# Patient Record
Sex: MALE | Race: WHITE | NOT HISPANIC OR LATINO | Employment: UNEMPLOYED | URBAN - METROPOLITAN AREA
[De-identification: names, ages, dates, MRNs, and addresses within clinical notes are randomized per-mention and may not be internally consistent; named-entity substitution may affect disease eponyms.]

---

## 2017-02-04 ENCOUNTER — OFFICE VISIT (OUTPATIENT)
Dept: URGENT CARE | Facility: PHYSICIAN GROUP | Age: 62
End: 2017-02-04
Payer: COMMERCIAL

## 2017-02-04 VITALS
RESPIRATION RATE: 16 BRPM | WEIGHT: 210 LBS | HEART RATE: 87 BPM | BODY MASS INDEX: 30.06 KG/M2 | HEIGHT: 70 IN | SYSTOLIC BLOOD PRESSURE: 143 MMHG | OXYGEN SATURATION: 95 % | TEMPERATURE: 97.2 F | DIASTOLIC BLOOD PRESSURE: 80 MMHG

## 2017-02-04 DIAGNOSIS — H61.23 BILATERAL HEARING LOSS DUE TO CERUMEN IMPACTION: ICD-10-CM

## 2017-02-04 PROCEDURE — 99202 OFFICE O/P NEW SF 15 MIN: CPT | Performed by: FAMILY MEDICINE

## 2017-02-04 ASSESSMENT — ENCOUNTER SYMPTOMS
NAUSEA: 0
VOMITING: 0
FEVER: 0
DIZZINESS: 0

## 2017-02-04 ASSESSMENT — PATIENT HEALTH QUESTIONNAIRE - PHQ9: CLINICAL INTERPRETATION OF PHQ2 SCORE: 0

## 2017-02-05 NOTE — PROGRESS NOTES
"Subjective:      Sara Benavides is a 61 y.o. male who presents with Cerumen Impaction            Cerumen Impaction  This is a recurrent problem. The current episode started in the past 7 days. The problem occurs constantly. The problem has been gradually worsening. Pertinent negatives include no fever, nausea or vomiting. Treatments tried: otc wax removal drops. Improvement on treatment: made worse.       Review of Systems   Constitutional: Negative for fever.   Gastrointestinal: Negative for nausea and vomiting.   Neurological: Negative for dizziness.     PMH:  has no past medical history on file.  MEDS:   Current outpatient prescriptions:   •  fenofibrate (TRIGLIDE) 160 MG tablet, TAKE 1 TABLET DAILY, Disp: 90 Tab, Rfl: 1  •  tadalafil (CIALIS) 20 MG tablet, Take 20 mg by mouth as needed for Erectile Dysfunction., Disp: , Rfl:   •  testosterone cypionate (DEPO-TESTOSTERONE) 200 MG/ML Solution injection, 1 mL by Intramuscular route every 14 days., Disp: 2 Vial, Rfl: 2  ALLERGIES: No Known Allergies  SURGHX:   Past Surgical History   Procedure Laterality Date   • Prostatectomy, radical retro       SOCHX:  reports that he has never smoked. He has never used smokeless tobacco. He reports that he does not drink alcohol or use illicit drugs.  FH: Family history was reviewed, no pertinent findings to report       Objective:     /80 mmHg  Pulse 87  Temp(Src) 36.2 °C (97.2 °F)  Resp 16  Ht 1.778 m (5' 10\")  Wt 95.255 kg (210 lb)  BMI 30.13 kg/m2  SpO2 95%     Physical Exam   Constitutional: He appears well-developed.   Pulmonary/Chest: Effort normal.   Neurological: He is alert.   Skin: Skin is warm and dry.   Psychiatric: He has a normal mood and affect.     BILATERAL cerumen impaction          Assessment/Plan:       1. Bilateral hearing loss due to cerumen impaction       Bilateral lavage resolved symptoms  Discussed ear care  Follow-up if symptoms worsen or fail to improve    "

## 2017-04-15 DIAGNOSIS — E78.5 DYSLIPIDEMIA: ICD-10-CM

## 2017-04-17 RX ORDER — FENOFIBRATE 160 MG/1
TABLET ORAL
Qty: 90 TAB | Refills: 1 | Status: SHIPPED | OUTPATIENT
Start: 2017-04-17 | End: 2017-11-26 | Stop reason: SDUPTHER

## 2017-04-17 NOTE — TELEPHONE ENCOUNTER
Refill given, patient due for labs although he does have an appointment in less than 10 days  Thank you  Gemini

## 2017-04-17 NOTE — TELEPHONE ENCOUNTER
Was the patient seen in the last year in this department? Yes     Does patient have an active prescription for medications requested? No     Received Request Via: Pharmacy     Last OV: 5/31/2016  Next OV: not scheduled  Last Labs: 5/28/2016     NEEDS APPT and LABS

## 2017-04-25 ENCOUNTER — TELEPHONE (OUTPATIENT)
Dept: MEDICAL GROUP | Facility: MEDICAL CENTER | Age: 62
End: 2017-04-25

## 2017-04-25 DIAGNOSIS — Z13.1 SCREENING FOR DIABETES MELLITUS: ICD-10-CM

## 2017-04-25 DIAGNOSIS — E78.5 DYSLIPIDEMIA: ICD-10-CM

## 2017-04-28 ENCOUNTER — TELEPHONE (OUTPATIENT)
Dept: MEDICAL GROUP | Facility: MEDICAL CENTER | Age: 62
End: 2017-04-28

## 2017-04-28 NOTE — TELEPHONE ENCOUNTER
----- Message from Nelly Arreola, Med Ass't sent at 4/27/2017 11:11 AM PDT -----  Regarding: FW: Non-Urgent Medical Question  Contact: [phone]      ----- Message -----     From: Sara Benavides     Sent: 4/27/2017  11:09 AM       To: Marily Gómez  Subject: Non-Urgent Medical Question                      Good morning!  Just a follow up in regards to the blood work.  I did get this done yesterday AM.  From [facility].

## 2017-04-28 NOTE — TELEPHONE ENCOUNTER
This pt just got his labs done at [facility] 2 days ago.   Can you please look out for his labs.   Maybe check [facility] in 3-4 days if we have not received them?   Thank you  Gemini

## 2017-05-02 LAB
ALBUMIN SERPL-MCNC: 4.2 G/DL (ref 3.6–4.8)
ALBUMIN/GLOB SERPL: 1.8 {RATIO} (ref 1.2–2.2)
ALP SERPL-CCNC: 43 IU/L (ref 39–117)
ALT SERPL-CCNC: 28 IU/L (ref 0–44)
AST SERPL-CCNC: 28 IU/L (ref 0–40)
BASOPHILS # BLD AUTO: 0 X10E3/UL (ref 0–0.2)
BASOPHILS NFR BLD AUTO: 1 %
BILIRUB SERPL-MCNC: 0.7 MG/DL (ref 0–1.2)
BUN SERPL-MCNC: 16 MG/DL (ref 8–27)
BUN/CREAT SERPL: 11 (ref 10–24)
CALCIUM SERPL-MCNC: 9 MG/DL (ref 8.6–10.2)
CHLORIDE SERPL-SCNC: 102 MMOL/L (ref 96–106)
CHOLEST SERPL-MCNC: 164 MG/DL (ref 100–199)
CO2 SERPL-SCNC: 25 MMOL/L (ref 18–29)
COMMENT 011824: ABNORMAL
CREAT SERPL-MCNC: 1.51 MG/DL (ref 0.76–1.27)
EOSINOPHIL # BLD AUTO: 0.3 X10E3/UL (ref 0–0.4)
EOSINOPHIL NFR BLD AUTO: 3 %
ERYTHROCYTE [DISTWIDTH] IN BLOOD BY AUTOMATED COUNT: 14.8 % (ref 12.3–15.4)
GLOBULIN SER CALC-MCNC: 2.4 G/DL (ref 1.5–4.5)
GLUCOSE SERPL-MCNC: 93 MG/DL (ref 65–99)
HCT VFR BLD AUTO: 52.9 % (ref 37.5–51)
HDLC SERPL-MCNC: 31 MG/DL
HGB BLD-MCNC: 18.2 G/DL (ref 12.6–17.7)
IMM GRANULOCYTES # BLD: 0 X10E3/UL (ref 0–0.1)
IMM GRANULOCYTES NFR BLD: 1 %
IMMATURE CELLS  115398: ABNORMAL
LDLC SERPL CALC-MCNC: 96 MG/DL (ref 0–99)
LYMPHOCYTES # BLD AUTO: 1.6 X10E3/UL (ref 0.7–3.1)
LYMPHOCYTES NFR BLD AUTO: 18 %
MCH RBC QN AUTO: 31.9 PG (ref 26.6–33)
MCHC RBC AUTO-ENTMCNC: 34.4 G/DL (ref 31.5–35.7)
MCV RBC AUTO: 93 FL (ref 79–97)
MONOCYTES # BLD AUTO: 0.7 X10E3/UL (ref 0.1–0.9)
MONOCYTES NFR BLD AUTO: 9 %
MORPHOLOGY BLD-IMP: ABNORMAL
NEUTROPHILS # BLD AUTO: 6 X10E3/UL (ref 1.4–7)
NEUTROPHILS NFR BLD AUTO: 68 %
NRBC BLD AUTO-RTO: ABNORMAL %
PLATELET # BLD AUTO: 221 X10E3/UL (ref 150–379)
POTASSIUM SERPL-SCNC: 4.3 MMOL/L (ref 3.5–5.2)
PROT SERPL-MCNC: 6.6 G/DL (ref 6–8.5)
RBC # BLD AUTO: 5.7 X10E6/UL (ref 4.14–5.8)
SODIUM SERPL-SCNC: 142 MMOL/L (ref 134–144)
TRIGL SERPL-MCNC: 187 MG/DL (ref 0–149)
VLDLC SERPL CALC-MCNC: 37 MG/DL (ref 5–40)
WBC # BLD AUTO: 8.6 X10E3/UL (ref 3.4–10.8)

## 2017-05-16 ENCOUNTER — OFFICE VISIT (OUTPATIENT)
Dept: URGENT CARE | Facility: PHYSICIAN GROUP | Age: 62
End: 2017-05-16
Payer: COMMERCIAL

## 2017-05-16 VITALS
RESPIRATION RATE: 16 BRPM | HEIGHT: 71 IN | WEIGHT: 210 LBS | BODY MASS INDEX: 29.4 KG/M2 | HEART RATE: 96 BPM | DIASTOLIC BLOOD PRESSURE: 76 MMHG | TEMPERATURE: 97.6 F | SYSTOLIC BLOOD PRESSURE: 130 MMHG | OXYGEN SATURATION: 96 %

## 2017-05-16 DIAGNOSIS — L08.9 LOCAL INFECTION OF SKIN AND SUBCUTANEOUS TISSUE: ICD-10-CM

## 2017-05-16 DIAGNOSIS — L95.9 VASCULITIS OF SKIN: ICD-10-CM

## 2017-05-16 PROCEDURE — 99214 OFFICE O/P EST MOD 30 MIN: CPT | Performed by: NURSE PRACTITIONER

## 2017-05-16 RX ORDER — AMOXICILLIN 500 MG/1
500 CAPSULE ORAL 3 TIMES DAILY
Qty: 21 CAP | Refills: 0 | Status: SHIPPED | OUTPATIENT
Start: 2017-05-16 | End: 2017-05-23

## 2017-05-16 RX ORDER — METHYLPREDNISOLONE 4 MG/1
4 TABLET ORAL DAILY
Qty: 1 KIT | Refills: 0 | Status: SHIPPED | OUTPATIENT
Start: 2017-05-16 | End: 2017-05-22

## 2017-05-16 RX ORDER — DOXYCYCLINE HYCLATE 100 MG
100 TABLET ORAL 2 TIMES DAILY
Qty: 14 TAB | Refills: 0 | Status: SHIPPED | OUTPATIENT
Start: 2017-05-16 | End: 2017-05-23

## 2017-05-16 ASSESSMENT — ENCOUNTER SYMPTOMS
ROS SKIN COMMENTS: ABRASION
RESPIRATORY NEGATIVE: 1
DIZZINESS: 0
GASTROINTESTINAL NEGATIVE: 1
MUSCULOSKELETAL NEGATIVE: 1
PSYCHIATRIC NEGATIVE: 1
FOCAL WEAKNESS: 0
SENSORY CHANGE: 0
TINGLING: 0
WEAKNESS: 0
DIAPHORESIS: 0
FEVER: 0
NEUROLOGICAL NEGATIVE: 1
CARDIOVASCULAR NEGATIVE: 1
NAUSEA: 0
CHILLS: 0
MYALGIAS: 0

## 2017-05-16 NOTE — PROGRESS NOTES
Subjective:      Sara Benavides is a 61 y.o. male who presents with Leg Swelling          HPI    The patient is here today with complaints of a rash and abrasions to bilateral lower extremities. He developed the abrasions while he was snorkeling in the [place] last week, while on a cruise, from his legs brushing up against a coral. He has noticed some tenderness and redness surrounding those abrasions since. One to 2 days after developing the abrasions, he started noticing a rash to bilateral lower extremities. The rash is mildly irritating, non-itchy, nondraining. He otherwise feels fine. He denies associated sore throat, cough, headache, joint pain, fever, chills, nausea, vomiting, numbness, tingling, malaise, or fatigue. He returned yesterday from his trip and is here today for evaluation. He has not taken any medication for symptom relief. He admits to experiencing a similar rash to bilateral lower extremities which he developed from a cruise ship as well 6 years ago.    History reviewed. No pertinent past medical history.  Past Surgical History   Procedure Laterality Date   • Prostatectomy, radical retro       Current Outpatient Prescriptions on File Prior to Visit   Medication Sig Dispense Refill   • fenofibrate (TRIGLIDE) 160 MG tablet TAKE 1 TABLET DAILY 90 Tab 1   • tadalafil (CIALIS) 20 MG tablet Take 20 mg by mouth as needed for Erectile Dysfunction.     • testosterone cypionate (DEPO-TESTOSTERONE) 200 MG/ML Solution injection 1 mL by Intramuscular route every 14 days. 2 Vial 2     No current facility-administered medications on file prior to visit.     Review of patient's allergies indicates no known allergies.      Review of Systems   Constitutional: Negative for fever, chills, malaise/fatigue and diaphoresis.   HENT: Negative.    Respiratory: Negative.    Cardiovascular: Negative.    Gastrointestinal: Negative.  Negative for nausea.   Genitourinary: Negative.    Musculoskeletal: Negative.  Negative for  "myalgias and joint pain.   Skin: Positive for rash. Negative for itching.        Abrasion   Neurological: Negative.  Negative for dizziness, tingling, sensory change, focal weakness and weakness.   Endo/Heme/Allergies: Negative.    Psychiatric/Behavioral: Negative.           Objective:     /76 mmHg  Pulse 96  Temp(Src) 36.4 °C (97.6 °F)  Resp 16  Ht 1.803 m (5' 11\")  Wt 95.255 kg (210 lb)  BMI 29.30 kg/m2  SpO2 96%     Physical Exam   Constitutional: He is oriented to person, place, and time. Vital signs are normal. He appears well-developed and well-nourished. He does not appear ill. No distress.   HENT:   Head: Normocephalic and atraumatic.   Right Ear: External ear normal.   Left Ear: External ear normal.   Nose: Nose normal.   Mouth/Throat: Oropharynx is clear and moist. No oropharyngeal exudate.   Eyes: Conjunctivae are normal. Pupils are equal, round, and reactive to light. Right eye exhibits no discharge. Left eye exhibits no discharge.   Neck: Normal range of motion. Neck supple.   Cardiovascular: Normal rate, regular rhythm, normal heart sounds and intact distal pulses.    Pulmonary/Chest: Effort normal and breath sounds normal. No respiratory distress.   Musculoskeletal: Normal range of motion. He exhibits no edema or tenderness.   Lymphadenopathy:     He has no cervical adenopathy.   Neurological: He is alert and oriented to person, place, and time. He has normal strength. No cranial nerve deficit or sensory deficit. He exhibits normal muscle tone. Coordination normal.   Skin: Skin is warm and dry. Rash noted. He is not diaphoretic. There is erythema. No cyanosis. No pallor. Nails show no clubbing.        Psychiatric: He has a normal mood and affect. His behavior is normal. Judgment and thought content normal.   Vitals reviewed.              Assessment/Plan:     1. Local infection of skin and subcutaneous tissue  amoxicillin (AMOXIL) 500 MG Cap    doxycycline (VIBRAMYCIN) 100 MG Tab   2. 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 He will also be treated for secondary skin infection with abrasions developed from coral reef, amox and doxy for broader coverage. He is given STRICT ER instructions.   Supportive care, differential diagnoses, and indications for immediate follow-up discussed with patient.   Pathogenesis of diagnosis discussed including typical length and natural progression.   Instructed to return to clinic or nearest emergency department for any change in condition, further concerns, or worsening of symptoms.  Patient states understanding of the plan of care and discharge instructions.  Instructed to make an appointment, for follow up, with their primary care provider.        MICHAELA Wills.

## 2017-05-19 ENCOUNTER — TELEPHONE (OUTPATIENT)
Dept: URGENT CARE | Facility: CLINIC | Age: 62
End: 2017-05-19

## 2017-05-19 NOTE — TELEPHONE ENCOUNTER
The patient was called for re-evaluation, tolerating medications, reports improvement, encouraged to call back to the clinic or return to clinic with any questions or concerns.

## 2017-05-22 ENCOUNTER — OFFICE VISIT (OUTPATIENT)
Dept: MEDICAL GROUP | Facility: MEDICAL CENTER | Age: 62
End: 2017-05-22
Payer: COMMERCIAL

## 2017-05-22 VITALS
BODY MASS INDEX: 30.46 KG/M2 | DIASTOLIC BLOOD PRESSURE: 86 MMHG | TEMPERATURE: 97.9 F | HEIGHT: 71 IN | SYSTOLIC BLOOD PRESSURE: 146 MMHG | RESPIRATION RATE: 16 BRPM | WEIGHT: 217.59 LBS | OXYGEN SATURATION: 93 % | HEART RATE: 92 BPM

## 2017-05-22 DIAGNOSIS — R03.0 ELEVATED BP WITHOUT DIAGNOSIS OF HYPERTENSION: ICD-10-CM

## 2017-05-22 DIAGNOSIS — D58.2 ELEVATED HEMOGLOBIN (HCC): ICD-10-CM

## 2017-05-22 DIAGNOSIS — E78.5 DYSLIPIDEMIA: ICD-10-CM

## 2017-05-22 DIAGNOSIS — R94.4 DECREASED GFR: ICD-10-CM

## 2017-05-22 DIAGNOSIS — E66.9 OBESITY (BMI 30-39.9): ICD-10-CM

## 2017-05-22 PROCEDURE — 99214 OFFICE O/P EST MOD 30 MIN: CPT | Performed by: PHYSICIAN ASSISTANT

## 2017-05-22 RX ORDER — CALCIUM CARB/VITAMIN D3/VIT K1 500-500-40
30 TABLET,CHEWABLE ORAL
COMMUNITY
End: 2017-05-22

## 2017-05-22 ASSESSMENT — PAIN SCALES - GENERAL: PAINLEVEL: NO PAIN

## 2017-05-22 NOTE — MR AVS SNAPSHOT
"        Sara Benavides   2017 2:00 PM   Office Visit   MRN: [id number]    Department:  [place] [address]   Dept Phone:  [phone]    Description:  Male : 1955   Provider:  Gemini Merritt PA-C           Reason for Visit     Follow-Up labs       Allergies as of 2017     No Known Allergies      You were diagnosed with     Elevated hemoglobin (CMS-HCC)   [944718]       Decreased GFR   [[id number]]         Vital Signs     Blood Pressure Pulse Temperature Respirations Height Weight    146/86 mmHg 92 36.6 °C (97.9 °F) 16 1.803 m (5' 11\") 98.7 kg (217 lb 9.5 oz)    Body Mass Index Oxygen Saturation Smoking Status             30.36 kg/m2 93% Never Smoker          Basic Information     Date Of Birth Sex Race Ethnicity Preferred Language    1955 Male White Non- English      Your appointments     2017  3:40 PM   ANNUAL EXAM PREVENTATIVE with Gemini Merritt PA-C   [facility]    [address]  [address]  [place] [id number] [phone]              Problem List              ICD-10-CM Priority Class Noted - Resolved    Hypogonadism male E29.1   2015 - Present    History of prostate cancer Z85.46   2015 - Present    Elevated BP [id number]   2015 - Present    Lower abdominal pain R10.30   2015 - Present    Gastroesophageal reflux disease without esophagitis K21.9   2015 - Present    Dyslipidemia E78.5   2016 - Present      Health Maintenance        Date Due Completion Dates    IMM DTaP/Tdap/Td Vaccine (1 - Tdap) 1975 ---    COLONOSCOPY 2006 ---    IMM ZOSTER VACCINE 2016 ---            Current Immunizations     No immunizations on file.      Below and/or attached are the medications your provider expects you to take. Review all of your home medications and newly ordered medications with your provider and/or pharmacist. Follow medication instructions as directed by your provider and/or pharmacist. " Please keep your medication list with you and share with your provider. Update the information when medications are discontinued, doses are changed, or new medications (including over-the-counter products) are added; and carry medication information at all times in the event of emergency situations     Allergies:  No Known Allergies          Medications  Valid as of: May 22, 2017 -  2:53 PM    Generic Name Brand Name Tablet Size Instructions for use    Amoxicillin (Cap) AMOXIL 500 MG Take 1 Cap by mouth 3 times a day for 7 days.        Doxycycline Hyclate (Tab) VIBRAMYCIN 100 MG Take 1 Tab by mouth 2 times a day for 7 days.        Fenofibrate (Tab) TRIGLIDE 160 MG TAKE 1 TABLET DAILY        Tadalafil (Tab) CIALIS 20 MG Take 20 mg by mouth as needed for Erectile Dysfunction.        Testosterone Cypionate (Solution) DEPO-TESTOSTERONE 200 MG/ML 1 mL by Intramuscular route every 14 days.        .                 Medicines prescribed today were sent to:     [facility] [address] [place] [address] - [address] [facility]    [address] [address]    Phone: [phone] Fax: [phone]    Open 24 Hours?: No    [facility] - [address] [address]    Phone: [phone] Fax: [phone]    Open 24 Hours?: No      Medication refill instructions:       If your prescription bottle indicates you have medication refills left, it is not necessary to call your provider’s office. Please contact your pharmacy and they will refill your medication.    If your prescription bottle indicates you do not have any refills left, you may request refills at any time through one of the following ways: The online [facility] system (except Urgent Care), by calling your provider’s office, or by asking your pharmacy to contact your provider’s office with a refill request. Medication refills are processed only during regular business hours  and may not be available until the next business day. Your provider may request additional information or to have a follow-up visit with you prior to refilling your medication.   *Please Note: Medication refills are assigned a new Rx number when refilled electronically. Your pharmacy may indicate that no refills were authorized even though a new prescription for the same medication is available at the pharmacy. Please request the medicine by name with the pharmacy before contacting your provider for a refill.        Your To Do List     Future Labs/Procedures Complete By Expires    BASIC METABOLIC PANEL  As directed 5/23/2018    CBC WITH DIFFERENTIAL  As directed 5/23/2018         MyChart Access Code: Activation code not generated  Current [facility] Status: Active

## 2017-05-23 NOTE — PROGRESS NOTES
Subjective:     Chief Complaint   Patient presents with   • Follow-Up     labs      Sara Benavides is a 61 y.o. male here today for elevated hgb, decreased GFR, cholesterol, BP as listed below     Ref. Range 12/21/2015 07:48 4/26/2017 06:56   RBC Latest Ref Range: 4.14-5.80 x10E6/uL 5.66 5.70   Hemoglobin Latest Ref Range: 12.6-17.7 g/dL 17.7 18.2 (H)   Hematocrit Latest Ref Range: 37.5-51.0 % 50.5 52.9 (H)   Denies tobacco use, snoring, morning fatigue, adding any new meds. Did have cold prior to labs.   Denies HA, palpitations, CP, SOB, fatigue.   Denies FHx autoimmune issues.        Ref. Range 4/26/2017 06:56   Bun Latest Ref Range: 8-27 mg/dL 16   Creatinine Latest Ref Range: 0.76-1.27 mg/dL 1.51 (H)   GFR If  Latest Ref Range: >59 mL/min/1.73 57 (L)   GFR If Non  Latest Ref Range: >59 mL/min/1.73 49 (L)   Bun-Creatinine Ratio Latest Ref Range: 10-24  11   Calcium Latest Ref Range: 8.6-10.2 mg/dL 9.0   Does not drink very much water.   Will take occasionally aleve but rarely.   Denies any hematuria, dysuria, freq, urgency, had prostate cancer and had total proctectomy.   Followed by urology and labs have been normal otherwise.     Dyslipidemia     Ref. Range 4/4/2016 09:26 5/28/2016 10:03 4/26/2017 06:56   Cholesterol,Tot Latest Ref Range: 100-199 mg/dL 204 (H) 173 164   Triglycerides Latest Ref Range: 0-149 mg/dL 254 (H) 148 187 (H)   HDL Latest Ref Range: >39 mg/dL 35 (L) 34 (L) 31 (L)   LDL Latest Ref Range: 0-99 mg/dL 118 (H) 109 (H) 96   VLDL Cholesterol Calc Latest Ref Range: 5-40 mg/dL 51 (H) 30 37   Treating with fenofibrate 160mg daily.  Diet: has been really working on this.   Exercising: has been walking a lot but no cardio still   Denies chest pain, shortness of breath, lightheadedness, abdominal discomfort, nausea, vomiting.   Has never smoked, denies any alcohol use.     Elevated BP without diagnosis of hypertension  Today /86  2nd reading 130/80  Has been working  "on diet but has not been exercising that much just walking some days of the week.   Denies HA, blurry vision, CP, SOB, dizziness, light headedness, leg swelling       Current medicines (including changes today)  Current Outpatient Prescriptions   Medication Sig Dispense Refill   • amoxicillin (AMOXIL) 500 MG Cap Take 1 Cap by mouth 3 times a day for 7 days. 21 Cap 0   • doxycycline (VIBRAMYCIN) 100 MG Tab Take 1 Tab by mouth 2 times a day for 7 days. 14 Tab 0   • fenofibrate (TRIGLIDE) 160 MG tablet TAKE 1 TABLET DAILY 90 Tab 1   • tadalafil (CIALIS) 20 MG tablet Take 20 mg by mouth as needed for Erectile Dysfunction.     • testosterone cypionate (DEPO-TESTOSTERONE) 200 MG/ML Solution injection 1 mL by Intramuscular route every 14 days. 2 Vial 2     No current facility-administered medications for this visit.     He  has no past medical history on file.    ROS   No chest pain, no shortness of breath, no abdominal pain       Objective:     Blood pressure 146/86, pulse 92, temperature 36.6 °C (97.9 °F), resp. rate 16, height 1.803 m (5' 11\"), weight 98.7 kg (217 lb 9.5 oz), SpO2 93 %. Body mass index is 30.36 kg/(m^2).   Physical Exam:  Alert, oriented in no acute distress.  Eye contact is good, speech goal directed, affect calm  HEENT: conjunctiva non-injected, sclera non-icteric, PERRL.  Neck No adenopathy or masses in the neck or supraclavicular regions.  Lungs: clear to auscultation bilaterally with good excursion.  CV: regular rate and rhythm.  Abdomen: soft, nontender, no HSM, No CVAT  Ext: no edema, color normal, peripheral pulses 2+, temperature normal    Assessment and Plan:   The following treatment plan was discussed     1. Elevated hemoglobin (CMS-HCC)  New dx, since first time and was normal last labs, we will just recheck.   - CBC WITH DIFFERENTIAL; Future    2. Decreased GFR  New dx, has normal cr/bun.   Recommend increase water, switch to tylenol if needs something for aches and pains.   Will be " rechecking and continue to f/u with urology as well as ask for there labs for our records.   - BASIC METABOLIC PANEL; Future    3. Dyslipidemia  Stable and improved since last year.   Discussed increasing exercise. continue working on diet and current regimen    4. Elevated BP without diagnosis of hypertension  2nd reading was normal. continue to monitor.   Recommend increasing exercise.     5. Obesity (BMI 30-39.9)  Discussed increasing exercise. continue working on diet  - Patient identified as having weight management issue.  Appropriate orders and counseling given.      Followup: Return in about 4 weeks (around 6/19/2017) for labs.           Please note that this dictation was created using voice recognition software. I have made every reasonable attempt to correct obvious errors, but I expect that there are errors of grammar and possibly content that I did not discover before finalizing the note.

## 2017-05-23 NOTE — ASSESSMENT & PLAN NOTE
Ref. Range 4/4/2016 09:26 5/28/2016 10:03 4/26/2017 06:56   Cholesterol,Tot Latest Ref Range: 100-199 mg/dL 204 (H) 173 164   Triglycerides Latest Ref Range: 0-149 mg/dL 254 (H) 148 187 (H)   HDL Latest Ref Range: >39 mg/dL 35 (L) 34 (L) 31 (L)   LDL Latest Ref Range: 0-99 mg/dL 118 (H) 109 (H) 96   VLDL Cholesterol Calc Latest Ref Range: 5-40 mg/dL 51 (H) 30 37   Treating with fenofibrate 160mg daily.  Diet: has been really working on this.   Exercising: has been walking a lot but no cardio still   Denies chest pain, shortness of breath, lightheadedness, abdominal discomfort, nausea, vomiting.   Has never smoked, denies any alcohol use.

## 2017-05-23 NOTE — ASSESSMENT & PLAN NOTE
Today /86  2nd reading 130/80  Has been working on diet but has not been exercising that much just walking some days of the week.   Denies HA, blurry vision, CP, SOB, dizziness, light headedness, leg swelling

## 2017-06-21 ENCOUNTER — OFFICE VISIT (OUTPATIENT)
Dept: MEDICAL GROUP | Facility: MEDICAL CENTER | Age: 62
End: 2017-06-21
Payer: COMMERCIAL

## 2017-06-21 VITALS
HEART RATE: 84 BPM | TEMPERATURE: 98.5 F | HEIGHT: 71 IN | SYSTOLIC BLOOD PRESSURE: 130 MMHG | RESPIRATION RATE: 14 BRPM | DIASTOLIC BLOOD PRESSURE: 70 MMHG | BODY MASS INDEX: 29.78 KG/M2 | OXYGEN SATURATION: 94 % | WEIGHT: 212.74 LBS

## 2017-06-21 DIAGNOSIS — Z23 NEED FOR SHINGLES VACCINE: ICD-10-CM

## 2017-06-21 DIAGNOSIS — K21.9 GASTROESOPHAGEAL REFLUX DISEASE WITHOUT ESOPHAGITIS: ICD-10-CM

## 2017-06-21 DIAGNOSIS — Z00.00 PREVENTATIVE HEALTH CARE: Primary | ICD-10-CM

## 2017-06-21 DIAGNOSIS — Z13.29 SCREENING FOR THYROID DISORDER: ICD-10-CM

## 2017-06-21 DIAGNOSIS — R03.0 ELEVATED BP WITHOUT DIAGNOSIS OF HYPERTENSION: ICD-10-CM

## 2017-06-21 DIAGNOSIS — Z85.46 HISTORY OF PROSTATE CANCER: ICD-10-CM

## 2017-06-21 DIAGNOSIS — E78.5 DYSLIPIDEMIA: ICD-10-CM

## 2017-06-21 DIAGNOSIS — E29.1 HYPOGONADISM MALE: ICD-10-CM

## 2017-06-21 PROCEDURE — 99396 PREV VISIT EST AGE 40-64: CPT | Performed by: PHYSICIAN ASSISTANT

## 2017-06-21 RX ORDER — SYRINGE WITH NEEDLE, 1 ML 25GX5/8"
SYRINGE, EMPTY DISPOSABLE MISCELLANEOUS
COMMUNITY
Start: 2017-05-24

## 2017-06-21 RX ORDER — METHYLPREDNISOLONE 4 MG/1
TABLET ORAL
Refills: 0 | COMMUNITY
Start: 2017-05-16 | End: 2017-06-21

## 2017-06-21 ASSESSMENT — PAIN SCALES - GENERAL: PAINLEVEL: NO PAIN

## 2017-06-21 NOTE — ASSESSMENT & PLAN NOTE
5/22/17 1st reading was /86, 2nd reading was 130/80  Checked it once after that visit at pharmacy and it was 130/84.  Today /70.   Has been working on diet   Walks a lot but just started going to the gym 2 days weekly and riding his bike.    Denies HA, blurry vision, CP, SOB, dizziness, light headedness, leg swelling

## 2017-06-21 NOTE — ASSESSMENT & PLAN NOTE
Treating with prilosec daily.   Hasn't noticed any difference with particular foods or portion size.   Has been trying to wean himself off the prilosec.   Will be due for colonoscopy in 6 mo.   Denies abdominal pain, N/V, blood in stool, tarry stools.

## 2017-06-21 NOTE — MR AVS SNAPSHOT
"        Sara Benavides   2017 3:40 PM   Office Visit   MRN: [id number]    Department:  [place] [address]   Dept Phone:  [phone]    Description:  Male : 1955   Provider:  Gemini Merritt PA-C           Reason for Visit     Annual Exam           Allergies as of 2017     No Known Allergies      You were diagnosed with     Preventative health care   [352580]  -  Primary     History of prostate cancer   [887177]       Hypogonadism male   [547133]       Gastroesophageal reflux disease without esophagitis   [775526]       Dyslipidemia   [290962]       Elevated BP without diagnosis of hypertension   [2808253]       Screening for thyroid disorder   [V77.0.ICD-9-CM]       Need for shingles vaccine   [857665]         Vital Signs     Blood Pressure Pulse Temperature Respirations Height Weight    130/70 mmHg 84 36.9 °C (98.5 °F) 14 1.803 m (5' 10.98\") 96.5 kg (212 lb 11.9 oz)    Body Mass Index Oxygen Saturation Smoking Status             29.68 kg/m2 94% Never Smoker          Basic Information     Date Of Birth Sex Race Ethnicity Preferred Language    1955 Male White Non- English      Problem List              ICD-10-CM Priority Class Noted - Resolved    Hypogonadism male E29.1   2015 - Present    History of prostate cancer Z85.46   2015 - Present    Elevated BP without diagnosis of hypertension R03.0   2015 - Present    Gastroesophageal reflux disease without esophagitis K21.9   2015 - Present    Dyslipidemia E78.5   2016 - Present    Obesity (BMI 30-39.9) E66.9   2017 - Present    Preventative health care Z00.00   2017 - Present      Health Maintenance        Date Due Completion Dates    IMM DTaP/Tdap/Td Vaccine (1 - Tdap) 1975 ---    COLONOSCOPY 2006 ---    IMM ZOSTER VACCINE 2016 ---            Current Immunizations     No immunizations on file.      Below and/or attached are the medications your provider expects you to take. Review all of " "your home medications and newly ordered medications with your provider and/or pharmacist. Follow medication instructions as directed by your provider and/or pharmacist. Please keep your medication list with you and share with your provider. Update the information when medications are discontinued, doses are changed, or new medications (including over-the-counter products) are added; and carry medication information at all times in the event of emergency situations     Allergies:  No Known Allergies          Medications  Valid as of: June 21, 2017 -  4:52 PM    Generic Name Brand Name Tablet Size Instructions for use    Fenofibrate (Tab) TRIGLIDE 160 MG TAKE 1 TABLET DAILY        Syringe/Needle (Disp) (Misc) B-D 3CC LUER-HOSSEIN SYR 11BB8-0/2 22G X 1-1/2\" 3 ML         Tadalafil (Tab) CIALIS 20 MG Take 20 mg by mouth as needed for Erectile Dysfunction.        Testosterone Cypionate (Solution) DEPO-TESTOSTERONE 200 MG/ML 1 mL by Intramuscular route every 14 days.        Zoster Vaccine Live (Recon Soln) ZOSTAVAX 64633 UNT/0.65ML Inject 0.65 mL as instructed Once for 1 dose.        .                 Medicines prescribed today were sent to:     [id number] - [place], [place] - [address] AT [facility] [address]    [address] [place] [address]    Phone: [phone] Fax: [phone]    Open 24 Hours?: No    [facility] - [place], [place] - [address] [place] [address]    [address] [facility] [address]    Phone: [phone] Fax: [phone]    Open 24 Hours?: No      Medication refill instructions:       If your prescription bottle indicates you have medication refills left, it is not necessary to call your provider’s office. Please contact your pharmacy and they will refill your medication.    If your prescription bottle indicates you do not have any refills left, you may request refills at any time through one of the following ways: The online [employer] system (except Urgent Care), " by calling your provider’s office, or by asking your pharmacy to contact your provider’s office with a refill request. Medication refills are processed only during regular business hours and may not be available until the next business day. Your provider may request additional information or to have a follow-up visit with you prior to refilling your medication.   *Please Note: Medication refills are assigned a new Rx number when refilled electronically. Your pharmacy may indicate that no refills were authorized even though a new prescription for the same medication is available at the pharmacy. Please request the medicine by name with the pharmacy before contacting your provider for a refill.        Your To Do List     Future Labs/Procedures Complete By Expires    CBC WITH DIFFERENTIAL  As directed 6/22/2018    COMP METABOLIC PANEL  As directed 6/22/2018    FREE THYROXINE  As directed 6/22/2018    LIPID PROFILE  As directed 6/22/2018    TSH  As directed 6/22/2018         MyChart Access Code: Activation code not generated  Current [employer] Status: Active

## 2017-06-21 NOTE — ASSESSMENT & PLAN NOTE
Ref. Range 4/4/2016 09:26 5/28/2016 10:03 4/26/2017 06:56   Cholesterol,Tot Latest Ref Range: 100-199 mg/dL 204 (H) 173 164   Triglycerides Latest Ref Range: 0-149 mg/dL 254 (H) 148 187 (H)   HDL Latest Ref Range: >39 mg/dL 35 (L) 34 (L) 31 (L)   LDL Latest Ref Range: 0-99 mg/dL 118 (H) 109 (H) 96   VLDL Cholesterol Calc Latest Ref Range: 5-40 mg/dL 51 (H) 30 37   Treating with fenofibrate 160mg daily.  Diet: 2 meals daily, occasional snacks. Little beef, mostly chicken, likes veggies. Likes fruit, no more soda.   Exercising: has been walking a lot started going to gym 2 days weekly. And riding bike.   Denies chest pain, shortness of breath, lightheadedness, abdominal discomfort, nausea, vomiting.   Has never smoked, denies any alcohol use.

## 2017-06-21 NOTE — ASSESSMENT & PLAN NOTE
Followed by urology  Dr. Martini- with Dr. Loaiza's office.   Was just seen a few weeks ago.   Will be getting some labs regarding elevated iron.   Pt will be getting these labs to me as well.   Although stated his PSA has been normal.

## 2017-06-21 NOTE — ASSESSMENT & PLAN NOTE
Treating with testosterone for >5years.    Treats with testosterone 200 mg inj every 14 days  He is to see urologist Krystyna Molina MD- prostate Ca, prostatectomy 2010.   Still seeing urology although Dr. Molina has retired.   Fatigue has improved since taking this.   Went without it for a little while and was very fatigued.

## 2017-06-22 NOTE — PROGRESS NOTES
Subjective:     Chief Complaint   Patient presents with   • Annual Exam     Sara Benavides is a 61 y.o. male here today for annual visit as listed below    Colonoscopy- due around 12/2017 per pt.   Shingles- not yet.   TDap- unsure when last one was. Will be checking with insurance on price.   Flu- declines    History of prostate cancer  Followed by urology  Dr. Martini- with Dr. Loaiza's office.   Was just seen a few weeks ago.   Will be getting some labs regarding elevated iron.   Pt will be getting these labs to me as well.   Although stated his PSA has been normal.     Hypogonadism male  Treating with testosterone for >5years.    Treats with testosterone 200 mg inj every 14 days  He is to see urologist Krystyna Molina MD- prostate Ca, prostatectomy 2010.   Still seeing urology although Dr. Molina has retired.   Fatigue has improved since taking this.   Went without it for a little while and was very fatigued.     Gastroesophageal reflux disease without esophagitis  Treating with prilosec daily.   Hasn't noticed any difference with particular foods or portion size.   Has been trying to wean himself off the prilosec.   Will be due for colonoscopy in 6 mo.   Denies abdominal pain, N/V, blood in stool, tarry stools.     Dyslipidemia     Ref. Range 4/4/2016 09:26 5/28/2016 10:03 4/26/2017 06:56   Cholesterol,Tot Latest Ref Range: 100-199 mg/dL 204 (H) 173 164   Triglycerides Latest Ref Range: 0-149 mg/dL 254 (H) 148 187 (H)   HDL Latest Ref Range: >39 mg/dL 35 (L) 34 (L) 31 (L)   LDL Latest Ref Range: 0-99 mg/dL 118 (H) 109 (H) 96   VLDL Cholesterol Calc Latest Ref Range: 5-40 mg/dL 51 (H) 30 37   Treating with fenofibrate 160mg daily.  Diet: 2 meals daily, occasional snacks. Little beef, mostly chicken, likes veggies. Likes fruit, no more soda.   Exercising: has been walking a lot started going to gym 2 days weekly. And riding bike.   Denies chest pain, shortness of breath, lightheadedness, abdominal discomfort, nausea,  "vomiting.   Has never smoked, denies any alcohol use.       Elevated BP without diagnosis of hypertension  5/22/17 1st reading was /86, 2nd reading was 130/80  Checked it once after that visit at pharmacy and it was 130/84.  Today /70.   Has been working on diet   Walks a lot but just started going to the gym 2 days weekly and riding his bike.    Denies HA, blurry vision, CP, SOB, dizziness, light headedness, leg swelling       Current medicines (including changes today)  Current Outpatient Prescriptions   Medication Sig Dispense Refill   • B-D 3CC LUER-HOSSEIN SYR 43ZE0-0/2 22G X 1-1/2\" 3 ML Misc      • zoster vaccine live, PF, (ZOSTAVAX) [id number] UNT/0.65ML injection Inject 0.65 mL as instructed Once for 1 dose. 0.65 mL 0   • fenofibrate (TRIGLIDE) 160 MG tablet TAKE 1 TABLET DAILY 90 Tab 1   • tadalafil (CIALIS) 20 MG tablet Take 20 mg by mouth as needed for Erectile Dysfunction.     • testosterone cypionate (DEPO-TESTOSTERONE) 200 MG/ML Solution injection 1 mL by Intramuscular route every 14 days. 2 Vial 2     No current facility-administered medications for this visit.     He  has no past medical history on file.    ROS   No chest pain, no shortness of breath, no abdominal pain       Objective:     Blood pressure 130/70, pulse 84, temperature 36.9 °C (98.5 °F), resp. rate 14, height 1.803 m (5' 10.98\"), weight 96.5 kg (212 lb 11.9 oz), SpO2 94 %. Body mass index is 29.68 kg/(m^2).   Physical Exam:  Alert, oriented in no acute distress.  Eye contact is good, speech goal directed, affect calm  HEENT: conjunctiva non-injected, sclera non-icteric, PERRL.  Pinna normal. TM pearly gray.   Oral mucous membranes pink and moist with no lesions.  Neck No adenopathy or masses in the neck or supraclavicular regions.  Lungs: clear to auscultation bilaterally with good excursion.  CV: regular rate and rhythm. No murmur  Abdomen: soft, nontender, no HSM, No CVAT  Ext: no edema, color normal, peripheral pulses 2+, " temperature normal      Assessment and Plan:   The following treatment plan was discussed     1. Preventative health care  Colonoscopy- due around 12/2017 per pt.   Shingles- not yet.   TDap- unsure when last one was. Will be checking with insurance on price.   Flu- declines  - CBC WITH DIFFERENTIAL; Future  - COMP METABOLIC PANEL; Future  - LIPID PROFILE; Future  - TSH; Future  - FREE THYROXINE; Future    2. History of prostate cancer  Controlled, continue following urology     3. Hypogonadism male  Controlled, continue current regimen    4. Gastroesophageal reflux disease without esophagitis  Controlled, continue current regimen    5. Dyslipidemia  Slightly elevated TG and low hdl, continue working on diet and increasing exercise.   - COMP METABOLIC PANEL; Future  - LIPID PROFILE; Future  - TSH; Future  - FREE THYROXINE; Future    6. Elevated BP without diagnosis of hypertension  Controlled, continue working on diet and increasing exercise.  - CBC WITH DIFFERENTIAL; Future    7. Screening for thyroid disorder  Labs ordered, will call for results  - TSH; Future  - FREE THYROXINE; Future    8. Need for shingles vaccine  Printed Rx for shingles vaccine.   - zoster vaccine live, PF, (ZOSTAVAX) [id number] UNT/0.65ML injection; Inject 0.65 mL as instructed Once for 1 dose.  Dispense: 0.65 mL; Refill: 0    Elevated Hgb/Hct  Per pt- urology is working this up. Pt will be getting the labs to me as well.       Followup: Return in about 6 months (around 12/21/2017) for labs and chronic conditions. .           Please note that this dictation was created using voice recognition software. I have made every reasonable attempt to correct obvious errors, but I expect that there are errors of grammar and possibly content that I did not discover before finalizing the note.

## 2017-11-26 DIAGNOSIS — E78.5 DYSLIPIDEMIA: ICD-10-CM

## 2017-11-27 NOTE — TELEPHONE ENCOUNTER
Was the patient seen in the last year in this department? Yes     Does patient have an active prescription for medications requested? No     Received Request Via: Pharmacy     Last visit:6/21/17

## 2017-11-28 RX ORDER — FENOFIBRATE 160 MG/1
TABLET ORAL
Qty: 90 TAB | Refills: 1 | Status: SHIPPED | OUTPATIENT
Start: 2017-11-28